# Patient Record
Sex: MALE | Race: WHITE | Employment: FULL TIME | URBAN - METROPOLITAN AREA
[De-identification: names, ages, dates, MRNs, and addresses within clinical notes are randomized per-mention and may not be internally consistent; named-entity substitution may affect disease eponyms.]

---

## 2018-08-05 ENCOUNTER — HOSPITAL ENCOUNTER (EMERGENCY)
Age: 38
Discharge: HOME | End: 2018-08-05
Attending: EMERGENCY MEDICINE
Payer: SELF-PAY

## 2018-08-05 VITALS
OXYGEN SATURATION: 94 % | TEMPERATURE: 99.5 F | HEIGHT: 67 IN | HEART RATE: 82 BPM | RESPIRATION RATE: 16 BRPM | BODY MASS INDEX: 28.56 KG/M2 | DIASTOLIC BLOOD PRESSURE: 74 MMHG | SYSTOLIC BLOOD PRESSURE: 145 MMHG | WEIGHT: 182 LBS

## 2018-08-05 DIAGNOSIS — L02.215 PERINEAL ABSCESS: Primary | ICD-10-CM

## 2018-08-05 LAB
ALBUMIN SERPL-MCNC: 3.8 G/DL (ref 3.5–5)
ALBUMIN/GLOB SERPL: 0.9 {RATIO} (ref 1.2–3.5)
ALP SERPL-CCNC: 118 U/L (ref 50–136)
ALT SERPL-CCNC: 32 U/L (ref 12–65)
ANION GAP SERPL CALC-SCNC: 8 MMOL/L (ref 7–16)
AST SERPL-CCNC: 14 U/L (ref 15–37)
BASOPHILS # BLD: 0 K/UL (ref 0–0.2)
BASOPHILS NFR BLD: 0 % (ref 0–2)
BILIRUB SERPL-MCNC: 0.3 MG/DL (ref 0.2–1.1)
BUN SERPL-MCNC: 11 MG/DL (ref 6–23)
CALCIUM SERPL-MCNC: 9.6 MG/DL (ref 8.3–10.4)
CHLORIDE SERPL-SCNC: 104 MMOL/L (ref 98–107)
CO2 SERPL-SCNC: 29 MMOL/L (ref 21–32)
CREAT SERPL-MCNC: 1.04 MG/DL (ref 0.8–1.5)
DIFFERENTIAL METHOD BLD: ABNORMAL
EOSINOPHIL # BLD: 0.1 K/UL (ref 0–0.8)
EOSINOPHIL NFR BLD: 0 % (ref 0.5–7.8)
ERYTHROCYTE [DISTWIDTH] IN BLOOD BY AUTOMATED COUNT: 12.8 % (ref 11.9–14.6)
GLOBULIN SER CALC-MCNC: 4.3 G/DL (ref 2.3–3.5)
GLUCOSE SERPL-MCNC: 128 MG/DL (ref 65–100)
HCT VFR BLD AUTO: 42 % (ref 41.1–50.3)
HGB BLD-MCNC: 14.5 G/DL (ref 13.6–17.2)
IMM GRANULOCYTES # BLD: 0 K/UL (ref 0–0.5)
IMM GRANULOCYTES NFR BLD AUTO: 0 % (ref 0–5)
LYMPHOCYTES # BLD: 2.6 K/UL (ref 0.5–4.6)
LYMPHOCYTES NFR BLD: 16 % (ref 13–44)
MCH RBC QN AUTO: 28 PG (ref 26.1–32.9)
MCHC RBC AUTO-ENTMCNC: 34.5 G/DL (ref 31.4–35)
MCV RBC AUTO: 81.2 FL (ref 79.6–97.8)
MONOCYTES # BLD: 1.2 K/UL (ref 0.1–1.3)
MONOCYTES NFR BLD: 8 % (ref 4–12)
NEUTS SEG # BLD: 11.7 K/UL (ref 1.7–8.2)
NEUTS SEG NFR BLD: 76 % (ref 43–78)
PLATELET # BLD AUTO: 311 K/UL (ref 150–450)
PMV BLD AUTO: 9.1 FL (ref 10.8–14.1)
POTASSIUM SERPL-SCNC: 3.8 MMOL/L (ref 3.5–5.1)
PROT SERPL-MCNC: 8.1 G/DL (ref 6.3–8.2)
RBC # BLD AUTO: 5.17 M/UL (ref 4.23–5.67)
SODIUM SERPL-SCNC: 141 MMOL/L (ref 136–145)
WBC # BLD AUTO: 15.6 K/UL (ref 4.3–11.1)

## 2018-08-05 PROCEDURE — 96375 TX/PRO/DX INJ NEW DRUG ADDON: CPT | Performed by: EMERGENCY MEDICINE

## 2018-08-05 PROCEDURE — 75810000289 HC I&D ABSCESS SIMP/COMP/MULT: Performed by: EMERGENCY MEDICINE

## 2018-08-05 PROCEDURE — 80053 COMPREHEN METABOLIC PANEL: CPT

## 2018-08-05 PROCEDURE — 81003 URINALYSIS AUTO W/O SCOPE: CPT | Performed by: EMERGENCY MEDICINE

## 2018-08-05 PROCEDURE — 74011250636 HC RX REV CODE- 250/636: Performed by: EMERGENCY MEDICINE

## 2018-08-05 PROCEDURE — 77030019895 HC PCKNG STRP IODO -A

## 2018-08-05 PROCEDURE — 74011000250 HC RX REV CODE- 250: Performed by: EMERGENCY MEDICINE

## 2018-08-05 PROCEDURE — 99284 EMERGENCY DEPT VISIT MOD MDM: CPT | Performed by: EMERGENCY MEDICINE

## 2018-08-05 PROCEDURE — 74011250636 HC RX REV CODE- 250/636

## 2018-08-05 PROCEDURE — 74011000250 HC RX REV CODE- 250

## 2018-08-05 PROCEDURE — 85025 COMPLETE CBC W/AUTO DIFF WBC: CPT

## 2018-08-05 PROCEDURE — 96365 THER/PROPH/DIAG IV INF INIT: CPT | Performed by: EMERGENCY MEDICINE

## 2018-08-05 PROCEDURE — 74011000258 HC RX REV CODE- 258: Performed by: EMERGENCY MEDICINE

## 2018-08-05 RX ORDER — HYDROCODONE BITARTRATE AND ACETAMINOPHEN 5; 325 MG/1; MG/1
1 TABLET ORAL
Qty: 12 TAB | Refills: 0 | Status: SHIPPED | OUTPATIENT
Start: 2018-08-05

## 2018-08-05 RX ORDER — SODIUM CHLORIDE 9 MG/ML
INJECTION INTRAMUSCULAR; INTRAVENOUS; SUBCUTANEOUS
Status: COMPLETED
Start: 2018-08-05 | End: 2018-08-05

## 2018-08-05 RX ORDER — PROMETHAZINE HYDROCHLORIDE 25 MG/ML
INJECTION, SOLUTION INTRAMUSCULAR; INTRAVENOUS
Status: COMPLETED
Start: 2018-08-05 | End: 2018-08-05

## 2018-08-05 RX ORDER — SODIUM CHLORIDE 0.9 % (FLUSH) 0.9 %
5-10 SYRINGE (ML) INJECTION AS NEEDED
Status: DISCONTINUED | OUTPATIENT
Start: 2018-08-05 | End: 2018-08-05 | Stop reason: HOSPADM

## 2018-08-05 RX ORDER — NALBUPHINE HYDROCHLORIDE 10 MG/ML
INJECTION, SOLUTION INTRAMUSCULAR; INTRAVENOUS; SUBCUTANEOUS
Status: COMPLETED
Start: 2018-08-05 | End: 2018-08-05

## 2018-08-05 RX ORDER — LIDOCAINE HYDROCHLORIDE AND EPINEPHRINE 10; 10 MG/ML; UG/ML
10 INJECTION, SOLUTION INFILTRATION; PERINEURAL
Status: DISCONTINUED | OUTPATIENT
Start: 2018-08-05 | End: 2018-08-05 | Stop reason: HOSPADM

## 2018-08-05 RX ORDER — SODIUM CHLORIDE 0.9 % (FLUSH) 0.9 %
5-10 SYRINGE (ML) INJECTION EVERY 8 HOURS
Status: DISCONTINUED | OUTPATIENT
Start: 2018-08-05 | End: 2018-08-05 | Stop reason: HOSPADM

## 2018-08-05 RX ORDER — CLINDAMYCIN HYDROCHLORIDE 300 MG/1
300 CAPSULE ORAL 4 TIMES DAILY
Qty: 28 CAP | Refills: 0 | Status: SHIPPED | OUTPATIENT
Start: 2018-08-05 | End: 2018-08-12

## 2018-08-05 RX ORDER — NALBUPHINE HYDROCHLORIDE 10 MG/ML
5 INJECTION, SOLUTION INTRAMUSCULAR; INTRAVENOUS; SUBCUTANEOUS
Status: COMPLETED | OUTPATIENT
Start: 2018-08-05 | End: 2018-08-05

## 2018-08-05 RX ADMIN — SODIUM CHLORIDE 10 ML: 9 INJECTION, SOLUTION INTRAMUSCULAR; INTRAVENOUS; SUBCUTANEOUS at 01:35

## 2018-08-05 RX ADMIN — Medication 10 ML: at 01:35

## 2018-08-05 RX ADMIN — SODIUM CHLORIDE 900 MG: 900 INJECTION, SOLUTION INTRAVENOUS at 01:25

## 2018-08-05 RX ADMIN — PROMETHAZINE HYDROCHLORIDE 12.5 MG: 25 INJECTION INTRAMUSCULAR; INTRAVENOUS at 01:26

## 2018-08-05 RX ADMIN — NALBUPHINE HYDROCHLORIDE 5 MG: 10 INJECTION, SOLUTION INTRAMUSCULAR; INTRAVENOUS; SUBCUTANEOUS at 01:35

## 2018-08-05 RX ADMIN — NALBUPHINE HYDROCHLORIDE 5 MG: 10 INJECTION, SOLUTION INTRAMUSCULAR; INTRAVENOUS; SUBCUTANEOUS at 01:33

## 2018-08-05 RX ADMIN — PROMETHAZINE HYDROCHLORIDE 12.5 MG: 25 INJECTION INTRAMUSCULAR; INTRAVENOUS at 01:34

## 2018-08-05 NOTE — ED TRIAGE NOTES
Patient arrives pov complaining of abscess in between testicles and rectum that appeared on Thursday. Patient reports some drainage. Patient reports fever since yesterday. Patient alert and oriented x4.

## 2018-08-05 NOTE — ED PROVIDER NOTES
HPI Comments: Patient presents with complaints of pain and swelling in the scrotal area. He reports his symptoms began several days ago and have been getting progressively worse. He denies prior occurrence but complains of severe pain with sitting. He reports some drainage. He reports a fever yesterday. Patient is a 30 y.o. male presenting with abscess. The history is provided by the patient. Abscess This is a new problem. The current episode started more than 2 days ago. The problem has been gradually worsening. The problem is associated with an unknown factor. There has been a fever of 101 - 101.9 F. The fever has been present for less than 1 day. The rash is present on the genitalia. The pain is at a severity of 8/10. The pain is severe. The pain has been constant since onset. Associated symptoms include pain and weeping. Pertinent negatives include no blisters, no itching and no hives. He has tried nothing for the symptoms. The treatment provided no relief. History reviewed. No pertinent past medical history. History reviewed. No pertinent surgical history. History reviewed. No pertinent family history. Social History Social History  Marital status: SINGLE Spouse name: N/A  
 Number of children: N/A  
 Years of education: N/A Occupational History  Not on file. Social History Main Topics  Smoking status: Current Every Day Smoker  Smokeless tobacco: Not on file Comment: cigar occas  Alcohol use No  
 Drug use: No  
 Sexual activity: Not Currently Other Topics Concern  Not on file Social History Narrative ALLERGIES: Review of patient's allergies indicates no known allergies. Review of Systems Skin: Negative for itching. All other systems reviewed and are negative. Vitals:  
 08/05/18 2219 BP: (!) 140/94 Pulse: 86 Resp: 16 Temp: 99.5 °F (37.5 °C) SpO2: 94% Weight: 82.6 kg (182 lb) Height: 5' 7\" (1.702 m)  
      
 
Physical Exam  
Constitutional: He is oriented to person, place, and time. He appears well-developed and well-nourished. No distress. HENT:  
Head: Normocephalic and atraumatic. Eyes: Conjunctivae and EOM are normal. Pupils are equal, round, and reactive to light. Genitourinary: Penis normal.  
Genitourinary Comments: Patient has a large abscess that is formed posterior to the scrotum and anterior to the anal orifice. No evidence of Fatimah's gangrene is noted Musculoskeletal: Normal range of motion. Neurological: He is alert and oriented to person, place, and time. Skin: Skin is warm and dry. Psychiatric: He has a normal mood and affect. His behavior is normal.  
Nursing note and vitals reviewed. MDM Number of Diagnoses or Management Options Amount and/or Complexity of Data Reviewed Clinical lab tests: ordered and reviewed Risk of Complications, Morbidity, and/or Mortality Presenting problems: moderate Diagnostic procedures: moderate Management options: moderate Patient Progress Patient progress: stable ED Course I&D Abcess Complex Date/Time: 8/5/2018 1:31 AM 
Performed by: Nelly Cueto Authorized by: Nelly Cueto Consent:  
  Consent obtained:  Verbal 
  Consent given by:  Patient Risks discussed:  Bleeding, damage to other organs and incomplete drainage Location:  
  Type:  Abscess Size:  Large Location:  Anogenital 
  Anogenital location:  Perineum Pre-procedure details:  
  Skin preparation:  Betadine Anesthesia (see MAR for exact dosages): Anesthesia method:  Local infiltration Local anesthetic:  Lidocaine 1% WITH epi Procedure type:  
  Complexity:  Complex Procedure details:  
  Needle aspiration: no Incision types:  Single straight Incision depth:  Subcutaneous Scalpel blade:  11 Wound management:  Probed and deloculated Drainage:  Purulent Drainage amount: Moderate   Wound treatment: Wound left open and drain placed Packing materials:  1/2 in gauze Post-procedure details:  
  Patient tolerance of procedure: Tolerated well, no immediate complications

## 2018-08-05 NOTE — DISCHARGE INSTRUCTIONS

## 2018-08-05 NOTE — LETTER
[facility] EMERGENCY DEPT One [address] [id number] [phone] Work/School Note Date: 8/5/2018 To Whom It May concern: 
 
Galo Kline was seen and treated today in the emergency room by the following provider(s): 
Attending Provider: Nayana Choudhury DO. Galo Kline may return to work on when cleared by surgeon. Sincerely, Nayana Choudhury DO

## 2018-08-05 NOTE — ED NOTES
I have reviewed discharge instructions with the patient. The patient verbalized understanding. Patient left ED via Discharge Method: ambulatory to Home with (insert name of family/friend, self, transport friend). Opportunity for questions and clarification provided. Patient given 2 scripts. To continue your aftercare when you leave the hospital, you may receive an automated call from our care team to check in on how you are doing. This is a free service and part of our promise to provide the best care and service to meet your aftercare needs.  If you have questions, or wish to unsubscribe from this service please call [phone]. Thank you for Choosing our [facility].

## 2018-08-10 ENCOUNTER — HOSPITAL ENCOUNTER (EMERGENCY)
Age: 38
Discharge: HOME | End: 2018-08-10
Attending: EMERGENCY MEDICINE
Payer: SELF-PAY

## 2018-08-10 VITALS
HEART RATE: 79 BPM | WEIGHT: 182 LBS | SYSTOLIC BLOOD PRESSURE: 157 MMHG | OXYGEN SATURATION: 98 % | TEMPERATURE: 98.1 F | DIASTOLIC BLOOD PRESSURE: 89 MMHG | BODY MASS INDEX: 28.56 KG/M2 | RESPIRATION RATE: 16 BRPM | HEIGHT: 67 IN

## 2018-08-10 DIAGNOSIS — Z51.89 WOUND CHECK, ABSCESS: Primary | ICD-10-CM

## 2018-08-10 PROCEDURE — 75810000275 HC EMERGENCY DEPT VISIT NO LEVEL OF CARE: Performed by: PHYSICIAN ASSISTANT

## 2018-08-10 NOTE — ED PROVIDER NOTES
HPI Comments: Patient is here for a wound check. He states he had an abscess drained in his perineal area on Saturday night, 6 days ago and has been taking clindamycin since then. It is doing much better. He is wanting a note to return back to work. He works Monday through Friday. He is not having a fever, nausea, vomiting, chest pain, shortness of breath, trouble with urination or bowel movements or other symptoms. He was ambulatory to the room without difficulty and well hydrated. He can sit without difficulty. Patient is a 30 y.o. male presenting with wound check. The history is provided by the patient. Wound Check    This is a new problem. The current episode started more than 2 days ago (6 days ago). The problem occurs constantly. The problem has been rapidly improving. Pain location: perineal area. The pain is at a severity of 0/10. The patient is experiencing no pain. Pertinent negatives include no numbness, full range of motion, no stiffness, no itching, no back pain and no neck pain. Treatments tried: Clindamycin. The treatment provided significant relief. There has been no history of extremity trauma. No past medical history on file. No past surgical history on file. No family history on file. Social History     Social History    Marital status: SINGLE     Spouse name: N/A    Number of children: N/A    Years of education: N/A     Occupational History    Not on file. Social History Main Topics    Smoking status: Current Every Day Smoker    Smokeless tobacco: Not on file      Comment: cigar occas    Alcohol use No    Drug use: No    Sexual activity: Not Currently     Other Topics Concern    Not on file     Social History Narrative         ALLERGIES: Review of patient's allergies indicates no known allergies. Review of Systems   Constitutional: Negative. HENT: Negative. Eyes: Negative. Respiratory: Negative. Cardiovascular: Negative. Gastrointestinal: Negative. Genitourinary: Negative. Musculoskeletal: Negative. Negative for back pain, neck pain and stiffness. Skin: Positive for wound. Negative for itching. Neurological: Negative. Negative for numbness. Psychiatric/Behavioral: Negative. All other systems reviewed and are negative. Vitals:    08/10/18 1623   BP: 157/89   Pulse: 79   Resp: 16   Temp: 98.1 °F (36.7 °C)   SpO2: 98%   Weight: 82.6 kg (182 lb)   Height: 5' 7\" (1.702 m)            Physical Exam   Constitutional: He is oriented to person, place, and time. He appears well-developed and well-nourished. HENT:   Head: Normocephalic and atraumatic. Right Ear: External ear normal.   Left Ear: External ear normal.   Nose: Nose normal.   Mouth/Throat: Oropharynx is clear and moist.   Eyes: Conjunctivae and EOM are normal. Pupils are equal, round, and reactive to light. Neck: Normal range of motion. Neck supple. Cardiovascular: Normal rate, regular rhythm, normal heart sounds and intact distal pulses. Pulmonary/Chest: Effort normal and breath sounds normal.   Abdominal: Soft. Bowel sounds are normal.   Genitourinary:         Musculoskeletal: Normal range of motion. Neurological: He is alert and oriented to person, place, and time. He has normal reflexes. Skin: Skin is warm and dry. Psychiatric: He has a normal mood and affect. His behavior is normal. Judgment and thought content normal.   Nursing note and vitals reviewed. MDM  Number of Diagnoses or Management Options  Wound check, abscess: minor  Risk of Complications, Morbidity, and/or Mortality  Presenting problems: moderate  Diagnostic procedures: moderate  Management options: moderate    Patient Progress  Patient progress: stable        ED Course       Procedures    The patient was observed in the ED. Patient was written a note to return to work on Monday.  Wash two-three times daily with soap and water, blot dry, apply neosporin and a clean dressing. Watch for redness, swelling, pus, increasing pain, fever and return if any of those symptoms began. Return to the ED if worse. I discussed the results of all labs, procedures, radiographs, and treatments with the patient and available family. Treatment plan is agreed upon and the patient is ready for discharge. All voiced understanding of the discharge plan and medication instructions or changes as appropriate. Questions about treatment in the ED were answered. All were encouraged to return should symptoms worsen or new problems develop.

## 2018-08-10 NOTE — DISCHARGE INSTRUCTIONS
Wound Care: After Your Visit to the Emergency Room  Your Care Instructions  The care you need depends on the type of wound you have. Taking good care of your wound at home will help it heal quickly and will reduce your chance of infection. Even though you have been released from the emergency room, you still need to watch for any problems. The doctor carefully checked you. But sometimes problems can develop later. If you have new symptoms, or if your symptoms do not get better, return to the emergency room or call your doctor right away. A visit to the emergency room is only one step in your treatment. Even if you feel better, you still need to do what your doctor recommends, such as going to all suggested follow-up appointments and taking medicines exactly as directed. This will help you recover and help prevent future problems. How can you care for yourself at home? · Clean the area with soap and water 2 times a day, or as your doctor tells you. Don't use hydrogen peroxide or alcohol, which can slow healing. ¨ Unless your doctor gives you other directions, cover the wound with a thin layer of antibiotic ointment, such as bacitracin, and a bandage. Do not use an ointment that contains neomycin, because it can irritate the skin. ¨ Apply more ointment and replace the bandage as your doctor tells you. ¨ If the bandage is stuck to a scab, soak it in warm water to soften the scab. This will make the bandage easier to remove. · Ask your doctor if you can take an over-the-counter pain medicine. Do not take two or more pain medicines at the same time unless the doctor told you to. · Some pain is normal with a wound, but do not ignore pain that is getting worse instead of better. You could have an infection. · Your doctor may have closed your wound with stitches (sutures), staples, or skin glue. ¨ If you have stitches, your doctor may remove them after several days to 2 weeks.  Or you may have stitches that dissolve on their own. ¨ If you have staples, your doctor may remove them after 7 to 10 days. ¨ If your wound was closed with skin glue, the glue will wear off in a few days to 2 weeks. When should you call for help? Return to the emergency room now if:  · You have signs of infection, such as:  ¨ Increased pain, swelling, warmth, or redness around the wound. ¨ Red streaks leading from the wound. ¨ Pus draining from the wound. ¨ Swollen lymph nodes in your neck, armpits, or groin. ¨ A fever. · The wound starts to bleed, and blood soaks through the bandage. (Oozing small amounts of blood is normal.)  Call your doctor today if:  · The wound is not getting better each day. Where can you learn more? Go to [employer]  Enter P907 in the search box to learn more about \"Wound Care: After Your Visit to the Emergency Room. \"   © 3619-4702 [employer], [employer]. Care instructions adapted under license by Romayne Duster (which disclaims liability or warranty for this information). This care instruction is for use with your licensed healthcare professional. If you have questions about a medical condition or this instruction, always ask your healthcare professional. [place] [address] any warranty or liability for your use of this information.   Content Version: 9.3.85929; Last Revised: July 22, 2010

## 2018-08-10 NOTE — LETTER
[facility] [facility] EMERGENCY DEPT One [address] [id number] 
[phone] Work/School Note Date: 8/10/2018 To Whom It May concern: 
 
Estelle Yuen was seen and treated today in the emergency room by the following provider(s): 
Attending Provider: [address] [place] [address] MD Cody 
Physician Assistant: TOR Bernal. Estelle Yuen may return to work on 08/13/18. Sincerely, TOR Bernal

## 2018-08-10 NOTE — ED NOTES
I have reviewed discharge instructions with the patient. The patient verbalized understanding. Patient left ED via Discharge Method: ambulatory to Home with (family). Opportunity for questions and clarification provided. Patient given 0 scripts. To continue your aftercare when you leave the hospital, you may receive an automated call from our care team to check in on how you are doing. This is a free service and part of our promise to provide the best care and service to meet your aftercare needs.  If you have questions, or wish to unsubscribe from this service please call [phone]. Thank you for Choosing our [facility] Emergency Department.

## 2019-06-26 ENCOUNTER — HOSPITAL ENCOUNTER (EMERGENCY)
Age: 39
Discharge: HOME | End: 2019-06-26
Payer: COMMERCIAL

## 2019-06-26 ENCOUNTER — APPOINTMENT (OUTPATIENT)
Dept: GENERAL RADIOLOGY | Age: 39
End: 2019-06-26
Payer: COMMERCIAL

## 2019-06-26 VITALS
WEIGHT: 191 LBS | DIASTOLIC BLOOD PRESSURE: 81 MMHG | HEART RATE: 80 BPM | OXYGEN SATURATION: 94 % | BODY MASS INDEX: 29.98 KG/M2 | SYSTOLIC BLOOD PRESSURE: 130 MMHG | HEIGHT: 67 IN | TEMPERATURE: 97.9 F | RESPIRATION RATE: 18 BRPM

## 2019-06-26 DIAGNOSIS — T17.908A ASPIRATION INTO AIRWAY, INITIAL ENCOUNTER: Primary | ICD-10-CM

## 2019-06-26 DIAGNOSIS — J68.0 ACUTE CHEMICAL PNEUMONITIS (HCC): ICD-10-CM

## 2019-06-26 LAB
ALBUMIN SERPL-MCNC: 4.1 G/DL (ref 3.5–5)
ALBUMIN/GLOB SERPL: 1.2 {RATIO} (ref 1.2–3.5)
ALP SERPL-CCNC: 95 U/L (ref 50–136)
ALT SERPL-CCNC: 62 U/L (ref 12–65)
ANION GAP SERPL CALC-SCNC: 7 MMOL/L (ref 7–16)
AST SERPL-CCNC: 36 U/L (ref 15–37)
BASOPHILS # BLD: 0 K/UL (ref 0–0.2)
BASOPHILS NFR BLD: 0 % (ref 0–2)
BILIRUB SERPL-MCNC: 0.3 MG/DL (ref 0.2–1.1)
BUN SERPL-MCNC: 13 MG/DL (ref 6–23)
CALCIUM SERPL-MCNC: 9.8 MG/DL (ref 8.3–10.4)
CHLORIDE SERPL-SCNC: 106 MMOL/L (ref 98–107)
CO2 SERPL-SCNC: 27 MMOL/L (ref 21–32)
CREAT SERPL-MCNC: 0.83 MG/DL (ref 0.8–1.5)
DIFFERENTIAL METHOD BLD: ABNORMAL
EOSINOPHIL # BLD: 0.2 K/UL (ref 0–0.8)
EOSINOPHIL NFR BLD: 3 % (ref 0.5–7.8)
ERYTHROCYTE [DISTWIDTH] IN BLOOD BY AUTOMATED COUNT: 12.3 % (ref 11.9–14.6)
GLOBULIN SER CALC-MCNC: 3.4 G/DL (ref 2.3–3.5)
GLUCOSE SERPL-MCNC: 105 MG/DL (ref 65–100)
HCT VFR BLD AUTO: 44.1 % (ref 41.1–50.3)
HGB BLD-MCNC: 14.9 G/DL (ref 13.6–17.2)
IMM GRANULOCYTES # BLD AUTO: 0 K/UL (ref 0–0.5)
IMM GRANULOCYTES NFR BLD AUTO: 0 % (ref 0–5)
LYMPHOCYTES # BLD: 2.9 K/UL (ref 0.5–4.6)
LYMPHOCYTES NFR BLD: 37 % (ref 13–44)
MCH RBC QN AUTO: 27.6 PG (ref 26.1–32.9)
MCHC RBC AUTO-ENTMCNC: 33.8 G/DL (ref 31.4–35)
MCV RBC AUTO: 81.8 FL (ref 79.6–97.8)
MONOCYTES # BLD: 0.6 K/UL (ref 0.1–1.3)
MONOCYTES NFR BLD: 7 % (ref 4–12)
NEUTS SEG # BLD: 4.1 K/UL (ref 1.7–8.2)
NEUTS SEG NFR BLD: 52 % (ref 43–78)
NRBC # BLD: 0 K/UL (ref 0–0.2)
PLATELET # BLD AUTO: 344 K/UL (ref 150–450)
PMV BLD AUTO: 9.2 FL (ref 9.4–12.3)
POTASSIUM SERPL-SCNC: 4.5 MMOL/L (ref 3.5–5.1)
PROT SERPL-MCNC: 7.5 G/DL (ref 6.3–8.2)
RBC # BLD AUTO: 5.39 M/UL (ref 4.23–5.6)
SODIUM SERPL-SCNC: 140 MMOL/L (ref 136–145)
WBC # BLD AUTO: 7.8 K/UL (ref 4.3–11.1)

## 2019-06-26 PROCEDURE — 74011000250 HC RX REV CODE- 250

## 2019-06-26 PROCEDURE — 85025 COMPLETE CBC W/AUTO DIFF WBC: CPT

## 2019-06-26 PROCEDURE — 94640 AIRWAY INHALATION TREATMENT: CPT

## 2019-06-26 PROCEDURE — 74011636637 HC RX REV CODE- 636/637

## 2019-06-26 PROCEDURE — 71046 X-RAY EXAM CHEST 2 VIEWS: CPT

## 2019-06-26 PROCEDURE — 99283 EMERGENCY DEPT VISIT LOW MDM: CPT

## 2019-06-26 PROCEDURE — 80053 COMPREHEN METABOLIC PANEL: CPT

## 2019-06-26 PROCEDURE — 96375 TX/PRO/DX INJ NEW DRUG ADDON: CPT

## 2019-06-26 PROCEDURE — 96374 THER/PROPH/DIAG INJ IV PUSH: CPT

## 2019-06-26 PROCEDURE — 74011250636 HC RX REV CODE- 250/636

## 2019-06-26 RX ORDER — HYDROGEN PEROXIDE 3 %
20 SOLUTION, NON-ORAL MISCELLANEOUS DAILY
Qty: 20 CAP | Refills: 0 | Status: SHIPPED | OUTPATIENT
Start: 2019-06-26 | End: 2019-07-16

## 2019-06-26 RX ORDER — METOCLOPRAMIDE HYDROCHLORIDE 5 MG/ML
10 INJECTION INTRAMUSCULAR; INTRAVENOUS
Status: COMPLETED | OUTPATIENT
Start: 2019-06-26 | End: 2019-06-26

## 2019-06-26 RX ORDER — IPRATROPIUM BROMIDE AND ALBUTEROL SULFATE 2.5; .5 MG/3ML; MG/3ML
3 SOLUTION RESPIRATORY (INHALATION)
Status: COMPLETED | OUTPATIENT
Start: 2019-06-26 | End: 2019-06-26

## 2019-06-26 RX ORDER — FAMOTIDINE 10 MG/ML
20 INJECTION INTRAVENOUS
Status: COMPLETED | OUTPATIENT
Start: 2019-06-26 | End: 2019-06-26

## 2019-06-26 RX ORDER — BENZONATATE 100 MG/1
100 CAPSULE ORAL
Qty: 30 CAP | Refills: 0 | Status: SHIPPED | OUTPATIENT
Start: 2019-06-26 | End: 2019-07-03

## 2019-06-26 RX ORDER — DIPHENHYDRAMINE HYDROCHLORIDE 50 MG/ML
25 INJECTION, SOLUTION INTRAMUSCULAR; INTRAVENOUS
Status: COMPLETED | OUTPATIENT
Start: 2019-06-26 | End: 2019-06-26

## 2019-06-26 RX ORDER — PREDNISONE 10 MG/1
TABLET ORAL
Qty: 21 TAB | Refills: 0 | Status: SHIPPED | OUTPATIENT
Start: 2019-06-26

## 2019-06-26 RX ADMIN — PREDNISONE 60 MG: 50 TABLET ORAL at 05:03

## 2019-06-26 RX ADMIN — IPRATROPIUM BROMIDE AND ALBUTEROL SULFATE 3 ML: .5; 3 SOLUTION RESPIRATORY (INHALATION) at 05:08

## 2019-06-26 RX ADMIN — METOCLOPRAMIDE 10 MG: 5 INJECTION, SOLUTION INTRAMUSCULAR; INTRAVENOUS at 05:09

## 2019-06-26 RX ADMIN — FAMOTIDINE 20 MG: 10 INJECTION INTRAVENOUS at 05:11

## 2019-06-26 RX ADMIN — DIPHENHYDRAMINE HYDROCHLORIDE 25 MG: 50 INJECTION INTRAMUSCULAR; INTRAVENOUS at 05:12

## 2019-06-26 NOTE — DISCHARGE INSTRUCTIONS

## 2019-06-26 NOTE — ED NOTES
I have reviewed discharge instructions with the patient. The patient verbalized understanding. Patient left ED via Discharge Method: ambulatory to Home with friend. Opportunity for questions and clarification provided. Patient given 3 scripts. To continue your aftercare when you leave the hospital, you may receive an automated call from our care team to check in on how you are doing. This is a free service and part of our promise to provide the best care and service to meet your aftercare needs.  If you have questions, or wish to unsubscribe from this service please call [phone]. Thank you for Choosing our [facility] Emergency Department.

## 2019-06-26 NOTE — ED PROVIDER NOTES
43-year-old male complaining of cough and vomiting. Patient states he has a bad case of esophageal reflux disease and suffered for many years last night he laid down and this morning he woke up feeling that he was going to vomit he may have aspirated some cough after that. .    The history is provided by the patient. Cough   This is a new problem. The problem has not changed since onset. There has been no fever. Associated symptoms include wheezing. Pertinent negatives include no chest pain, no chills, no sweats and no weight loss. He has tried nothing for the symptoms. Respiratory Distress   Associated symptoms include cough and wheezing. Pertinent negatives include no chest pain. No past medical history on file. No past surgical history on file. No family history on file.     Social History     Socioeconomic History    Marital status: SINGLE     Spouse name: Not on file    Number of children: Not on file    Years of education: Not on file    Highest education level: Not on file   Occupational History    Not on file   Social Needs    Financial resource strain: Not on file    Food insecurity:     Worry: Not on file     Inability: Not on file    Transportation needs:     Medical: Not on file     Non-medical: Not on file   Tobacco Use    Smoking status: Current Every Day Smoker    Tobacco comment: cigar occas   Substance and Sexual Activity    Alcohol use: No    Drug use: No    Sexual activity: Not Currently   Lifestyle    Physical activity:     Days per week: Not on file     Minutes per session: Not on file    Stress: Not on file   Relationships    Social connections:     Talks on phone: Not on file     Gets together: Not on file     Attends Religion service: Not on file     Active member of club or organization: Not on file     Attends meetings of clubs or organizations: Not on file     Relationship status: Not on file    Intimate partner violence:     Fear of current or ex partner: Not on file     Emotionally abused: Not on file     Physically abused: Not on file     Forced sexual activity: Not on file   Other Topics Concern    Not on file   Social History Narrative    Not on file         ALLERGIES: Patient has no known allergies. Review of Systems   Constitutional: Negative. Negative for activity change, chills and weight loss. HENT: Negative. Eyes: Negative. Respiratory: Positive for cough and wheezing. Cardiovascular: Negative. Negative for chest pain. Gastrointestinal: Negative. Genitourinary: Negative. Musculoskeletal: Negative. Skin: Negative. Neurological: Negative. Psychiatric/Behavioral: Negative. All other systems reviewed and are negative. Vitals:    06/26/19 0410   BP: (!) 137/92   Pulse: 75   Resp: 18   Temp: 97.8 °F (36.6 °C)   SpO2: 96%   Weight: 86.6 kg (191 lb)   Height: 5' 7\" (1.702 m)            Physical Exam   Constitutional: He is oriented to person, place, and time. He appears well-developed and well-nourished. No distress. HENT:   Head: Normocephalic and atraumatic. Right Ear: External ear normal.   Left Ear: External ear normal.   Nose: Nose normal.   Mouth/Throat: Oropharynx is clear and moist. No oropharyngeal exudate. Eyes: Pupils are equal, round, and reactive to light. Conjunctivae and EOM are normal. Right eye exhibits no discharge. Left eye exhibits no discharge. No scleral icterus. Neck: Normal range of motion. Neck supple. No JVD present. No tracheal deviation present. Cardiovascular: Normal rate, regular rhythm and intact distal pulses. Pulmonary/Chest: Effort normal. No stridor. No respiratory distress. He has wheezes. He exhibits no tenderness. Abdominal: Soft. Bowel sounds are normal. He exhibits no distension and no mass. There is no tenderness. Musculoskeletal: Normal range of motion. He exhibits no edema or tenderness. Neurological: He is alert and oriented to person, place, and time. No cranial nerve deficit. Skin: Skin is warm and dry. No rash noted. He is not diaphoretic. No erythema. No pallor. Psychiatric: He has a normal mood and affect. His behavior is normal. Thought content normal.   Nursing note and vitals reviewed. MDM  Number of Diagnoses or Management Options  Aspiration into airway, initial encounter: new and requires workup  Diagnosis management comments: Prescription the patient episode of having gastric reflux and causing him to have uncontrollable cough is most likely he aspirated as a chemical pneumonitis.   Will place him on steroids and cough medicine follow-up with his primary care doctor also will put him on Reglan before bed and PPI       Amount and/or Complexity of Data Reviewed  Tests in the radiology section of CPT®: ordered and reviewed           Procedures

## 2019-06-26 NOTE — ED TRIAGE NOTES
Pt arrives alone via POV with c/o sore throat and shortness of breath x 2 hours. Pt states he has acid reflux and \"this feels like one of my attacks. \" Pt tried taking zantac without relief. This pain woke pt out of his sleep.

## 2020-08-29 ENCOUNTER — HOSPITAL ENCOUNTER (EMERGENCY)
Age: 40
Discharge: HOME | End: 2020-08-29
Attending: EMERGENCY MEDICINE
Payer: COMMERCIAL

## 2020-08-29 VITALS
DIASTOLIC BLOOD PRESSURE: 69 MMHG | TEMPERATURE: 99 F | OXYGEN SATURATION: 97 % | WEIGHT: 189 LBS | BODY MASS INDEX: 30.37 KG/M2 | RESPIRATION RATE: 16 BRPM | HEIGHT: 66 IN | HEART RATE: 71 BPM | SYSTOLIC BLOOD PRESSURE: 110 MMHG

## 2020-08-29 DIAGNOSIS — R11.2 NON-INTRACTABLE VOMITING WITH NAUSEA, UNSPECIFIED VOMITING TYPE: Primary | ICD-10-CM

## 2020-08-29 DIAGNOSIS — R10.12 ABDOMINAL PAIN, LUQ (LEFT UPPER QUADRANT): ICD-10-CM

## 2020-08-29 LAB
ALBUMIN SERPL-MCNC: 4.1 G/DL (ref 3.5–5)
ALBUMIN/GLOB SERPL: 1.1 {RATIO} (ref 1.2–3.5)
ALP SERPL-CCNC: 111 U/L (ref 50–136)
ALT SERPL-CCNC: 71 U/L (ref 12–65)
AMPHET UR QL SCN: NEGATIVE
ANION GAP SERPL CALC-SCNC: 4 MMOL/L (ref 7–16)
AST SERPL-CCNC: 31 U/L (ref 15–37)
BACTERIA URNS QL MICRO: 0 /HPF
BARBITURATES UR QL SCN: NEGATIVE
BASOPHILS # BLD: 0 K/UL (ref 0–0.2)
BASOPHILS NFR BLD: 0 % (ref 0–2)
BENZODIAZ UR QL: NEGATIVE
BILIRUB SERPL-MCNC: 0.2 MG/DL (ref 0.2–1.1)
BUN SERPL-MCNC: 14 MG/DL (ref 6–23)
CALCIUM SERPL-MCNC: 9 MG/DL (ref 8.3–10.4)
CANNABINOIDS UR QL SCN: NEGATIVE
CASTS URNS QL MICRO: 0 /LPF
CHLORIDE SERPL-SCNC: 108 MMOL/L (ref 98–107)
CO2 SERPL-SCNC: 28 MMOL/L (ref 21–32)
COCAINE UR QL SCN: NEGATIVE
CREAT SERPL-MCNC: 0.94 MG/DL (ref 0.8–1.5)
DIFFERENTIAL METHOD BLD: ABNORMAL
EOSINOPHIL # BLD: 0.2 K/UL (ref 0–0.8)
EOSINOPHIL NFR BLD: 2 % (ref 0.5–7.8)
EPI CELLS #/AREA URNS HPF: 0 /HPF
ERYTHROCYTE [DISTWIDTH] IN BLOOD BY AUTOMATED COUNT: 12.6 % (ref 11.9–14.6)
GLOBULIN SER CALC-MCNC: 3.7 G/DL (ref 2.3–3.5)
GLUCOSE SERPL-MCNC: 97 MG/DL (ref 65–100)
HCT VFR BLD AUTO: 44.6 % (ref 41.1–50.3)
HGB BLD-MCNC: 15.4 G/DL (ref 13.6–17.2)
IMM GRANULOCYTES # BLD AUTO: 0 K/UL (ref 0–0.5)
IMM GRANULOCYTES NFR BLD AUTO: 0 % (ref 0–5)
LIPASE SERPL-CCNC: 157 U/L (ref 73–393)
LYMPHOCYTES # BLD: 3.1 K/UL (ref 0.5–4.6)
LYMPHOCYTES NFR BLD: 38 % (ref 13–44)
MCH RBC QN AUTO: 28.2 PG (ref 26.1–32.9)
MCHC RBC AUTO-ENTMCNC: 34.5 G/DL (ref 31.4–35)
MCV RBC AUTO: 81.7 FL (ref 79.6–97.8)
METHADONE UR QL: NEGATIVE
MONOCYTES # BLD: 0.7 K/UL (ref 0.1–1.3)
MONOCYTES NFR BLD: 9 % (ref 4–12)
NEUTS SEG # BLD: 3.9 K/UL (ref 1.7–8.2)
NEUTS SEG NFR BLD: 50 % (ref 43–78)
NRBC # BLD: 0 K/UL (ref 0–0.2)
OPIATES UR QL: NEGATIVE
PCP UR QL: NEGATIVE
PLATELET # BLD AUTO: 307 K/UL (ref 150–450)
PMV BLD AUTO: 9.3 FL (ref 9.4–12.3)
POTASSIUM SERPL-SCNC: 3.7 MMOL/L (ref 3.5–5.1)
PROT SERPL-MCNC: 7.8 G/DL (ref 6.3–8.2)
RBC # BLD AUTO: 5.46 M/UL (ref 4.23–5.6)
RBC #/AREA URNS HPF: NORMAL /HPF
SODIUM SERPL-SCNC: 140 MMOL/L (ref 136–145)
WBC # BLD AUTO: 7.9 K/UL (ref 4.3–11.1)
WBC URNS QL MICRO: NORMAL /HPF

## 2020-08-29 PROCEDURE — 74011000250 HC RX REV CODE- 250: Performed by: EMERGENCY MEDICINE

## 2020-08-29 PROCEDURE — 81015 MICROSCOPIC EXAM OF URINE: CPT

## 2020-08-29 PROCEDURE — 85025 COMPLETE CBC W/AUTO DIFF WBC: CPT

## 2020-08-29 PROCEDURE — 96375 TX/PRO/DX INJ NEW DRUG ADDON: CPT

## 2020-08-29 PROCEDURE — 74011250636 HC RX REV CODE- 250/636: Performed by: EMERGENCY MEDICINE

## 2020-08-29 PROCEDURE — 74011250637 HC RX REV CODE- 250/637: Performed by: EMERGENCY MEDICINE

## 2020-08-29 PROCEDURE — 80053 COMPREHEN METABOLIC PANEL: CPT

## 2020-08-29 PROCEDURE — 96374 THER/PROPH/DIAG INJ IV PUSH: CPT

## 2020-08-29 PROCEDURE — 83690 ASSAY OF LIPASE: CPT

## 2020-08-29 PROCEDURE — 80307 DRUG TEST PRSMV CHEM ANLYZR: CPT

## 2020-08-29 PROCEDURE — 81003 URINALYSIS AUTO W/O SCOPE: CPT

## 2020-08-29 PROCEDURE — 99284 EMERGENCY DEPT VISIT MOD MDM: CPT

## 2020-08-29 RX ORDER — HYOSCYAMINE SULFATE 0.12 MG/1
0.25 TABLET SUBLINGUAL
Qty: 20 TAB | Refills: 0 | Status: SHIPPED | OUTPATIENT
Start: 2020-08-29

## 2020-08-29 RX ORDER — METOCLOPRAMIDE HYDROCHLORIDE 5 MG/ML
10 INJECTION INTRAMUSCULAR; INTRAVENOUS
Status: COMPLETED | OUTPATIENT
Start: 2020-08-29 | End: 2020-08-29

## 2020-08-29 RX ORDER — HYOSCYAMINE SULFATE 0.12 MG/1
0.25 TABLET SUBLINGUAL
Status: COMPLETED | OUTPATIENT
Start: 2020-08-29 | End: 2020-08-29

## 2020-08-29 RX ORDER — ONDANSETRON 8 MG/1
8 TABLET, ORALLY DISINTEGRATING ORAL
Qty: 12 TAB | Refills: 1 | Status: SHIPPED | OUTPATIENT
Start: 2020-08-29

## 2020-08-29 RX ORDER — MORPHINE SULFATE 2 MG/ML
2 INJECTION, SOLUTION INTRAMUSCULAR; INTRAVENOUS
Status: COMPLETED | OUTPATIENT
Start: 2020-08-29 | End: 2020-08-29

## 2020-08-29 RX ORDER — ONDANSETRON 2 MG/ML
4 INJECTION INTRAMUSCULAR; INTRAVENOUS
Status: COMPLETED | OUTPATIENT
Start: 2020-08-29 | End: 2020-08-29

## 2020-08-29 RX ADMIN — SODIUM CHLORIDE 1000 ML: 900 INJECTION, SOLUTION INTRAVENOUS at 18:57

## 2020-08-29 RX ADMIN — MORPHINE SULFATE 2 MG: 2 INJECTION, SOLUTION INTRAMUSCULAR; INTRAVENOUS at 19:55

## 2020-08-29 RX ADMIN — METOCLOPRAMIDE 10 MG: 5 INJECTION, SOLUTION INTRAMUSCULAR; INTRAVENOUS at 19:55

## 2020-08-29 RX ADMIN — FAMOTIDINE 20 MG: 10 INJECTION INTRAVENOUS at 18:56

## 2020-08-29 RX ADMIN — HYOSCYAMINE SULFATE 0.25 MG: 0.12 TABLET ORAL; SUBLINGUAL at 18:56

## 2020-08-29 RX ADMIN — ONDANSETRON 4 MG: 2 INJECTION INTRAMUSCULAR; INTRAVENOUS at 18:56

## 2020-08-29 NOTE — ED PROVIDER NOTES
31-year-old gentleman presents with left upper quadrant pain, nausea, and vomiting. Onset just prior to arrival.  Patient had just eaten a couple snack cakes beforehand but they did not taste suspicious. He estimates he is thrown up 6 or 7 times at this point and is nonbloody, there is been no diarrhea. Of note, since arrival, there is probably 8 to 10 ounces of beige-tan emesis of thin consistency in the green convenience bag and provided to him at triage. No past medical history on file. No past surgical history on file. No family history on file.     Social History     Socioeconomic History    Marital status: SINGLE     Spouse name: Not on file    Number of children: Not on file    Years of education: Not on file    Highest education level: Not on file   Occupational History    Not on file   Social Needs    Financial resource strain: Not on file    Food insecurity     Worry: Not on file     Inability: Not on file    Transportation needs     Medical: Not on file     Non-medical: Not on file   Tobacco Use    Smoking status: Current Every Day Smoker    Tobacco comment: cigar occas   Substance and Sexual Activity    Alcohol use: No    Drug use: No    Sexual activity: Not Currently   Lifestyle    Physical activity     Days per week: Not on file     Minutes per session: Not on file    Stress: Not on file   Relationships    Social connections     Talks on phone: Not on file     Gets together: Not on file     Attends Church service: Not on file     Active member of club or organization: Not on file     Attends meetings of clubs or organizations: Not on file     Relationship status: Not on file    Intimate partner violence     Fear of current or ex partner: Not on file     Emotionally abused: Not on file     Physically abused: Not on file     Forced sexual activity: Not on file   Other Topics Concern    Not on file   Social History Narrative    Not on file         ALLERGIES: Patient has no known allergies. Review of Systems   Constitutional: Negative for chills and fever. HENT: Negative for rhinorrhea and sore throat. Eyes: Negative for discharge and redness. Respiratory: Negative for cough and shortness of breath. Cardiovascular: Negative for chest pain and palpitations. Gastrointestinal: Positive for abdominal pain, nausea and vomiting. Negative for diarrhea. Genitourinary: Negative for difficulty urinating and dysuria. Musculoskeletal: Negative for arthralgias and back pain. Skin: Negative for rash. Neurological: Negative for dizziness and headaches. All other systems reviewed and are negative. Vitals:    08/29/20 1825   BP: (!) 163/93   Pulse: 78   Resp: 16   Temp: 99 °F (37.2 °C)   SpO2: 99%   Weight: 85.7 kg (189 lb)   Height: 5' 6\" (1.676 m)            Physical Exam  Vitals signs and nursing note reviewed. Constitutional:       General: He is in acute distress. Appearance: Normal appearance. He is well-developed. He is not ill-appearing, toxic-appearing or diaphoretic. Comments: Initially very calm and comfortable at first introduction as he is holding a bag partially filled with emesis. Later, after his Zofran and Levsin and fluids, patient and parent extreme amount of pain, and he had only small amounts of additional emesis. HENT:      Head: Normocephalic and atraumatic. Eyes:      General: No scleral icterus. Right eye: No discharge. Left eye: No discharge. Conjunctiva/sclera: Conjunctivae normal.      Pupils: Pupils are equal, round, and reactive to light. Neck:      Musculoskeletal: Normal range of motion and neck supple. Cardiovascular:      Rate and Rhythm: Normal rate and regular rhythm. Heart sounds: Normal heart sounds. No murmur. No gallop. Pulmonary:      Effort: Pulmonary effort is normal. No respiratory distress. Breath sounds: Normal breath sounds. No wheezing or rales. Abdominal:      General: Bowel sounds are normal.      Palpations: Abdomen is soft. Tenderness: There is no abdominal tenderness. There is no guarding. Musculoskeletal: Normal range of motion. Skin:     General: Skin is warm and dry. Neurological:      General: No focal deficit present. Mental Status: He is alert and oriented to person, place, and time. Mental status is at baseline. Motor: No abnormal muscle tone. Comments: cni 2-12 grossly   Psychiatric:         Mood and Affect: Mood normal.         Behavior: Behavior normal.          MDM  Number of Diagnoses or Management Options  Abdominal pain, LUQ (left upper quadrant):   Non-intractable vomiting with nausea, unspecified vomiting type:   Diagnosis management comments: Medical decision making note:  Nausea with vomiting subjective pain disproportionate to lack of objective tenderness. Labs normal.  Seems worse after Levsin and Zofran. We will give 2 of morphine with some Reglan and reevaluate, attempt to get urine drug screen to evaluate for possible THC hyperemesis syndrome. 9:02 PM better after 2 of morphine and 10 of Reglan, tolerating p.o. fluid challenge. Home with Zofran Levsin    This concludes the \"medical decision making note\" part of this emergency department visit note.            Procedures

## 2020-08-29 NOTE — ED TRIAGE NOTES
Pt ambulatory to triage without complications. Pt states about an hour ago he ate 2 snack cakes and began getting LUQ pain with n/v. Pt denies fever, diarrhea, cough, SOB. Pt states pain 10/10 at this time. Pt denies hx of DM or pancreatitis. Pt states this happened about 4 days ago but no other time than these two events.

## 2020-08-30 NOTE — DISCHARGE INSTRUCTIONS
Patient Education        Abdominal Pain: Care Instructions  Your Care Instructions     Abdominal pain has many possible causes. Some aren't serious and get better on their own in a few days. Others need more testing and treatment. If your pain continues or gets worse, you need to be rechecked and may need more tests to find out what is wrong. You may need surgery to correct the problem. Don't ignore new symptoms, such as fever, nausea and vomiting, urination problems, pain that gets worse, and dizziness. These may be signs of a more serious problem. Your doctor may have recommended a follow-up visit in the next 8 to 12 hours. If you are not getting better, you may need more tests or treatment. The doctor has checked you carefully, but problems can develop later. If you notice any problems or new symptoms, get medical treatment right away. Follow-up care is a key part of your treatment and safety. Be sure to make and go to all appointments, and call your doctor if you are having problems. It's also a good idea to know your test results and keep a list of the medicines you take. How can you care for yourself at home? · Rest until you feel better. · To prevent dehydration, drink plenty of fluids, enough so that your urine is light yellow or clear like water. Choose water and other caffeine-free clear liquids until you feel better. If you have kidney, heart, or liver disease and have to limit fluids, talk with your doctor before you increase the amount of fluids you drink. · If your stomach is upset, eat mild foods, such as rice, dry toast or crackers, bananas, and applesauce. Try eating several small meals instead of two or three large ones. · Wait until 48 hours after all symptoms have gone away before you have spicy foods, alcohol, and drinks that contain caffeine. · Do not eat foods that are high in fat. · Avoid anti-inflammatory medicines such as aspirin, ibuprofen (Advil, Motrin), and naproxen (Aleve). These can cause stomach upset. Talk to your doctor if you take daily aspirin for another health problem. When should you call for help? [id number] anytime you think you may need emergency care. For example, call if:  · You passed out (lost consciousness). · You pass maroon or very bloody stools. · You vomit blood or what looks like coffee grounds. · You have new, severe belly pain. Call your doctor now or seek immediate medical care if:  · Your pain gets worse, especially if it becomes focused in one area of your belly. · You have a new or higher fever. · Your stools are black and look like tar, or they have streaks of blood. · You have unexpected vaginal bleeding. · You have symptoms of a urinary tract infection. These may include:  ? Pain when you urinate. ? Urinating more often than usual.  ? Blood in your urine. · You are dizzy or lightheaded, or you feel like you may faint. Watch closely for changes in your health, and be sure to contact your doctor if:  · You are not getting better after 1 day (24 hours). Where can you learn more? Go to http://www.gray.com/  Enter E851 in the search box to learn more about \"Abdominal Pain: Care Instructions. \"  Current as of: June 26, 2019               Content Version: 12.5  © 1721-4776 [employer]. Care instructions adapted under license by [employer] (which disclaims liability or warranty for this information). If you have questions about a medical condition or this instruction, always ask your healthcare professional. [place] [address] any warranty or liability for your use of this information. Patient Education        Nausea and Vomiting: Care Instructions  Your Care Instructions     When you are nauseated, you may feel weak and sweaty and notice a lot of saliva in your mouth. Nausea often leads to vomiting.  Most of the time you do not need to worry about nausea and vomiting, but they can be signs of other illnesses. Two common causes of nausea and vomiting are stomach flu and food poisoning. Nausea and vomiting from viral stomach flu will usually start to improve within 24 hours. Nausea and vomiting from food poisoning may last from 12 to 48 hours. The doctor has checked you carefully, but problems can develop later. If you notice any problems or new symptoms, get medical treatment right away. Follow-up care is a key part of your treatment and safety. Be sure to make and go to all appointments, and call your doctor if you are having problems. It's also a good idea to know your test results and keep a list of the medicines you take. How can you care for yourself at home? · To prevent dehydration, drink plenty of fluids, enough so that your urine is light yellow or clear like water. Choose water and other caffeine-free clear liquids until you feel better. If you have kidney, heart, or liver disease and have to limit fluids, talk with your doctor before you increase the amount of fluids you drink. · Rest in bed until you feel better. · When you are able to eat, try clear soups, mild foods, and liquids until all symptoms are gone for 12 to 48 hours. Other good choices include dry toast, crackers, cooked cereal, and gelatin dessert, such as Jell-O. When should you call for help? [id number] anytime you think you may need emergency care. For example, call if:  · You passed out (lost consciousness). Call your doctor now or seek immediate medical care if:  · You have symptoms of dehydration, such as:  ? Dry eyes and a dry mouth. ? Passing only a little dark urine. ? Feeling thirstier than usual.  · You have new or worsening belly pain. · You have a new or higher fever. · You vomit blood or what looks like coffee grounds. Watch closely for changes in your health, and be sure to contact your doctor if:  · You have ongoing nausea and vomiting. · Your vomiting is getting worse.   · Your vomiting lasts longer than 2 days. · You are not getting better as expected. Where can you learn more? Go to http://drew-mina.info/  Enter H591 in the search box to learn more about \"Nausea and Vomiting: Care Instructions. \"  Current as of: June 26, 2019               Content Version: 12.5  © 2525-0965 [employer]. Care instructions adapted under license by [employer] (which disclaims liability or warranty for this information). If you have questions about a medical condition or this instruction, always ask your healthcare professional. [address] any warranty or liability for your use of this information.

## 2020-08-30 NOTE — ED NOTES
I have reviewed discharge instructions with the patient. The patient verbalized understanding. Patient left ED via Discharge Method: ambulatory to Home with (insert name of family/friend, self, transport family). Opportunity for questions and clarification provided. Patient given 2 scripts. To continue your aftercare when you leave the hospital, you may receive an automated call from our care team to check in on how you are doing. This is a free service and part of our promise to provide the best care and service to meet your aftercare needs.  If you have questions, or wish to unsubscribe from this service please call [phone]. Thank you for Choosing our [facility].